# Patient Record
Sex: FEMALE | Race: WHITE | NOT HISPANIC OR LATINO | Employment: FULL TIME | URBAN - METROPOLITAN AREA
[De-identification: names, ages, dates, MRNs, and addresses within clinical notes are randomized per-mention and may not be internally consistent; named-entity substitution may affect disease eponyms.]

---

## 2023-07-18 ENCOUNTER — OFFICE VISIT (OUTPATIENT)
Dept: URGENT CARE | Facility: CLINIC | Age: 68
End: 2023-07-18
Payer: MEDICARE

## 2023-07-18 VITALS
BODY MASS INDEX: 25.76 KG/M2 | SYSTOLIC BLOOD PRESSURE: 160 MMHG | HEART RATE: 98 BPM | TEMPERATURE: 99 F | DIASTOLIC BLOOD PRESSURE: 94 MMHG | WEIGHT: 140 LBS | RESPIRATION RATE: 16 BRPM | HEIGHT: 62 IN | OXYGEN SATURATION: 99 %

## 2023-07-18 DIAGNOSIS — R03.0 ELEVATED BP WITHOUT DIAGNOSIS OF HYPERTENSION: ICD-10-CM

## 2023-07-18 DIAGNOSIS — J01.90 ACUTE RHINOSINUSITIS: Primary | ICD-10-CM

## 2023-07-18 PROCEDURE — 99202 OFFICE O/P NEW SF 15 MIN: CPT | Mod: 25,,, | Performed by: FAMILY MEDICINE

## 2023-07-18 PROCEDURE — 99202 PR OFFICE/OUTPT VISIT, NEW, LEVL II, 15-29 MIN: ICD-10-PCS | Mod: 25,,, | Performed by: FAMILY MEDICINE

## 2023-07-18 PROCEDURE — 96372 PR INJECTION,THERAP/PROPH/DIAG2ST, IM OR SUBCUT: ICD-10-PCS | Mod: ,,, | Performed by: FAMILY MEDICINE

## 2023-07-18 PROCEDURE — 96372 THER/PROPH/DIAG INJ SC/IM: CPT | Mod: ,,, | Performed by: FAMILY MEDICINE

## 2023-07-18 RX ORDER — DEXAMETHASONE SODIUM PHOSPHATE 100 MG/10ML
5 INJECTION INTRAMUSCULAR; INTRAVENOUS ONCE
Status: COMPLETED | OUTPATIENT
Start: 2023-07-18 | End: 2023-07-18

## 2023-07-18 RX ADMIN — DEXAMETHASONE SODIUM PHOSPHATE 5 MG: 100 INJECTION INTRAMUSCULAR; INTRAVENOUS at 09:07

## 2023-07-18 NOTE — PROGRESS NOTES
"Subjective:      Patient ID: Bianca Heath is a 67 y.o. female.    Vitals:  height is 5' 2" (1.575 m) and weight is 63.5 kg (140 lb). Her temperature is 98.5 °F (36.9 °C). Her blood pressure is 160/94 (abnormal) and her pulse is 98. Her respiration is 16 and oxygen saturation is 99%.     Chief Complaint: Sinus Problem (Friday sore throat, felt like fever, chills, sinus pressure, slight cough. Tried taking nyquil, excedrin. At home covid test negative. Declines further testing. )    HPI:  67-year-old female otherwise healthy present to clinic with concerns of nasal congestion, sinus congestion, sore throat, chills associated with postnasal drip and coughing since 5 days.  Symptoms gradual in onset and worsening.  No measured fever at home.  Over-the-counter medication as listed above some help.  States COVID test home has been negative.  No concerns of positive exposure to infections.  Blood pressure elevated.  Possible over-the-counter NyQuil.  Caution.    ROS :  Constitutional : _ felt feverish, positive for chills, no body aches or headache  Eyes : _No redness, drainage or pain  HENT_sore throat, postnasal drainage  Respiratory_no wheezing, no shortness of breath  Cardiovascular_no chest pain  Gastrointestinal_ No vomiting, No diarrhea, No abdominal pain  Musculoskeletal_no joint pain, no joint swelling  Integumentary_no skin rash or abnormal lesion    Objective:     Physical Exam  General : Alert and Oriented, No apparent distress, afebrile  Neck - supple  HENT : Oropharynx no redness or swelling.  Bilateral TMs intact mild fluid no redness.   Respiratory : Bilateral equal breath sounds, nonlabored respirations  Cardiovascular : Rate, rhythm regular, normal volume pulse, no murmur  Gastrointestinal: Full abdomen, soft, nontender to palpate  Integumentary : Warm, Dry and no rash    Assessment:     1. Acute rhinosinusitis    2. Elevated BP without diagnosis of hypertension      Plan:   Discussed the physical " finding, condition and course.  Monitor the symptoms closely.  Claritin or Allegra for congestion.  Robitussin DM for cough and cold.  Decadron IM today risk and benefits discussed voiced understanding.  For worsening symptoms and signs of infection call clinic will consider antibiotics.  Home COVID 19 test negative    Blood pressure elevated in the clinic today.  Concerns of over-the-counter cough and cold medications.  Caution.  Monitor blood pressure and maintain the log    Acute rhinosinusitis  -     dexAMETHasone injection 5 mg    Elevated BP without diagnosis of hypertension

## 2023-07-18 NOTE — PATIENT INSTRUCTIONS
Discussed the physical finding, condition and course.  Monitor the symptoms closely.  Claritin or Allegra for congestion.  Robitussin DM for cough and cold.  Decadron IM today risk and benefits discussed voiced understanding.  For worsening symptoms and signs of infection call clinic will consider antibiotics.  Home COVID 19 test negative    Blood pressure elevated in the clinic today.  Concerns of over-the-counter cough and cold medications.  Caution.  Monitor blood pressure and maintain the log

## 2023-07-18 NOTE — LETTER
July 18, 2023      [facility] [place]  [address]  [place] [address]  Phone: [phone]       Patient: Bianca Heath   YOB: 1955  Date of Visit: 07/18/2023    To Whom It May Concern:    Juan Heath  was at [facility] on 07/18/2023. The patient may return to work/school on 07/20/2023 with no restrictions. If you have any questions or concerns, or if I can be of further assistance, please do not hesitate to contact me.    Sincerely,

## 2023-09-07 ENCOUNTER — PATIENT MESSAGE (OUTPATIENT)
Dept: RESEARCH | Facility: HOSPITAL | Age: 68
End: 2023-09-07
Payer: MEDICARE

## 2023-11-30 ENCOUNTER — OFFICE VISIT (OUTPATIENT)
Dept: URGENT CARE | Facility: CLINIC | Age: 68
End: 2023-11-30
Payer: MEDICARE

## 2023-11-30 VITALS
OXYGEN SATURATION: 98 % | HEART RATE: 93 BPM | SYSTOLIC BLOOD PRESSURE: 158 MMHG | WEIGHT: 140 LBS | HEIGHT: 62 IN | TEMPERATURE: 98 F | DIASTOLIC BLOOD PRESSURE: 98 MMHG | RESPIRATION RATE: 18 BRPM | BODY MASS INDEX: 25.76 KG/M2

## 2023-11-30 DIAGNOSIS — J32.9 SINUSITIS, UNSPECIFIED CHRONICITY, UNSPECIFIED LOCATION: Primary | ICD-10-CM

## 2023-11-30 PROCEDURE — 96372 PR INJECTION,THERAP/PROPH/DIAG2ST, IM OR SUBCUT: ICD-10-PCS | Mod: ,,, | Performed by: FAMILY MEDICINE

## 2023-11-30 PROCEDURE — 99213 OFFICE O/P EST LOW 20 MIN: CPT | Mod: 25,,, | Performed by: FAMILY MEDICINE

## 2023-11-30 PROCEDURE — 96372 THER/PROPH/DIAG INJ SC/IM: CPT | Mod: ,,, | Performed by: FAMILY MEDICINE

## 2023-11-30 PROCEDURE — 99213 PR OFFICE/OUTPT VISIT, EST, LEVL III, 20-29 MIN: ICD-10-PCS | Mod: 25,,, | Performed by: FAMILY MEDICINE

## 2023-11-30 RX ORDER — AMOXICILLIN AND CLAVULANATE POTASSIUM 875; 125 MG/1; MG/1
1 TABLET, FILM COATED ORAL EVERY 12 HOURS
Qty: 14 TABLET | Refills: 0 | Status: SHIPPED | OUTPATIENT
Start: 2023-11-30 | End: 2023-12-07

## 2023-11-30 RX ORDER — PREDNISONE 10 MG/1
30 TABLET ORAL DAILY
Qty: 9 TABLET | Refills: 0 | Status: SHIPPED | OUTPATIENT
Start: 2023-11-30 | End: 2023-12-03

## 2023-11-30 RX ORDER — DEXAMETHASONE SODIUM PHOSPHATE 100 MG/10ML
8 INJECTION INTRAMUSCULAR; INTRAVENOUS
Status: COMPLETED | OUTPATIENT
Start: 2023-11-30 | End: 2023-11-30

## 2023-11-30 RX ADMIN — DEXAMETHASONE SODIUM PHOSPHATE 8 MG: 100 INJECTION INTRAMUSCULAR; INTRAVENOUS at 02:11

## 2023-11-30 NOTE — PATIENT INSTRUCTIONS
Plan:   Medications sent to pharmacy  Start oral steroids tomorrow  Hold antibiotics for 2-3 days and start if symptoms persist or worsen  Start taking an allergy pill daily such as claritin, zyrtec, allegrea or xyzal. Also start using a nasal steroid spray such as flonase or nasacort daily. If you are not being treated for high blood pressure, you can also take decongestant such as sudafed as needed. They can be purchased over the counter. If oral steroids were prescribed, start them tomorrow morning. Monitor for fever. Take tylenol/acetaminophen or ibuprofen as needed. Rest and hydrate. If symptoms persist or worsen, return to clinic or seek medical attention immediately.

## 2023-11-30 NOTE — PROGRESS NOTES
"Subjective:      Patient ID: Bianca Heath is a 67 y.o. female.    Vitals:  height is 5' 2" (1.575 m) and weight is 63.5 kg (140 lb). Her temperature is 98.1 °F (36.7 °C). Her blood pressure is 158/98 (abnormal) and her pulse is 93. Her respiration is 18 and oxygen saturation is 98%.     Chief Complaint: Sinus Problem (Sinus congestion, started 1 week )    67-year-old female presents to clinic complaining of a one-week history of cough and congestion.  Reports sinus pressure as well.  Thinks she may have had a fever the 1st 3 days.  Declines any testing.  Denies any shortness a breath body aches vomiting or diarrhea.  Does report some sinus headaches.        Constitution: Negative.   HENT:  Positive for congestion.    Cardiovascular: Negative.    Eyes: Negative.    Respiratory:  Positive for cough.    Gastrointestinal: Negative.    Genitourinary: Negative.    Musculoskeletal: Negative.    Skin: Negative.    Allergic/Immunologic: Negative.    Neurological: Negative.    Hematologic/Lymphatic: Negative.       Objective:     Physical Exam   Constitutional: She is oriented to person, place, and time. She appears well-developed. She is cooperative.  Non-toxic appearance. She does not appear ill. No distress.   HENT:   Head: Normocephalic and atraumatic.   Ears:   Right Ear: Hearing and external ear normal.   Left Ear: Hearing and external ear normal.   Mouth/Throat: Oropharynx is clear and moist and mucous membranes are normal. No oropharyngeal exudate or posterior oropharyngeal erythema (postnasal drip).   Eyes: Conjunctivae and lids are normal.   Neck: Trachea normal and phonation normal. Neck supple. No edema present. No erythema present. No neck rigidity present.   Cardiovascular: Normal rate.   Pulmonary/Chest: Effort normal and breath sounds normal. No stridor. No respiratory distress. She has no decreased breath sounds. She has no wheezes. She has no rhonchi. She has no rales.   Abdominal: Normal appearance. " "  Lymphadenopathy:     She has no cervical adenopathy.   Neurological: She is alert and oriented to person, place, and time. She exhibits normal muscle tone. Coordination normal.   Skin: Skin is warm, dry, intact, not diaphoretic and no rash.   Psychiatric: Her speech is normal and behavior is normal. Mood, judgment and thought content normal.   Nursing note and vitals reviewed.         Previous History      Review of patient's allergies indicates:   Allergen Reactions    Codeine Nausea And Vomiting       Past Medical History:   Diagnosis Date    Known health problems: none      Current Outpatient Medications   Medication Instructions    amoxicillin-clavulanate 875-125mg (AUGMENTIN) 875-125 mg per tablet 1 tablet, Oral, Every 12 hours    predniSONE (DELTASONE) 30 mg, Oral, Daily     Past Surgical History:   Procedure Laterality Date    LAPAROTOMY      Emergency Surgery post giving birth    TONSILLECTOMY       Family History   Problem Relation Age of Onset    Hypertension Mother     Diabetes Mother     Hypertension Father     Diabetes Father     Heart failure Father     Diabetes Sister     Diabetes Brother        Social History     Tobacco Use    Smoking status: Never     Passive exposure: Never    Smokeless tobacco: Never   Substance Use Topics    Alcohol use: Yes    Drug use: Never        Physical Exam      Vital Signs Reviewed   BP (!) 158/98   Pulse 93   Temp 98.1 °F (36.7 °C)   Resp 18   Ht 5' 2" (1.575 m)   Wt 63.5 kg (140 lb)   SpO2 98%   BMI 25.61 kg/m²        Procedures    Procedures     Labs   No results found for this or any previous visit.    Assessment:     1. Sinusitis, unspecified chronicity, unspecified location        Plan:   Medications sent to pharmacy  Start oral steroids tomorrow  Hold antibiotics for 2-3 days and start if symptoms persist or worsen  Start taking an allergy pill daily such as claritin, zyrtec, allegrea or xyzal. Also start using a nasal steroid spray such as flonase or " nasacort daily. If you are not being treated for high blood pressure, you can also take decongestant such as sudafed as needed. They can be purchased over the counter. If oral steroids were prescribed, start them tomorrow morning. Monitor for fever. Take tylenol/acetaminophen or ibuprofen as needed. Rest and hydrate. If symptoms persist or worsen, return to clinic or seek medical attention immediately.       Sinusitis, unspecified chronicity, unspecified location    Other orders  -     dexAMETHasone injection 8 mg  -     predniSONE (DELTASONE) 10 MG tablet; Take 3 tablets (30 mg total) by mouth once daily. for 3 days  Dispense: 9 tablet; Refill: 0  -     amoxicillin-clavulanate 875-125mg (AUGMENTIN) 875-125 mg per tablet; Take 1 tablet by mouth every 12 (twelve) hours. for 7 days  Dispense: 14 tablet; Refill: 0

## 2024-05-06 ENCOUNTER — OFFICE VISIT (OUTPATIENT)
Dept: URGENT CARE | Facility: CLINIC | Age: 69
End: 2024-05-06
Payer: MEDICARE

## 2024-05-06 VITALS
WEIGHT: 150 LBS | HEIGHT: 62 IN | DIASTOLIC BLOOD PRESSURE: 89 MMHG | BODY MASS INDEX: 27.6 KG/M2 | TEMPERATURE: 98 F | SYSTOLIC BLOOD PRESSURE: 161 MMHG | HEART RATE: 85 BPM | OXYGEN SATURATION: 99 % | RESPIRATION RATE: 16 BRPM

## 2024-05-06 DIAGNOSIS — J06.9 ACUTE URI: Primary | ICD-10-CM

## 2024-05-06 PROCEDURE — 96372 THER/PROPH/DIAG INJ SC/IM: CPT | Mod: ,,, | Performed by: FAMILY MEDICINE

## 2024-05-06 PROCEDURE — 99212 OFFICE O/P EST SF 10 MIN: CPT | Mod: 25,,, | Performed by: FAMILY MEDICINE

## 2024-05-06 RX ORDER — BETAMETHASONE SODIUM PHOSPHATE AND BETAMETHASONE ACETATE 3; 3 MG/ML; MG/ML
6 INJECTION, SUSPENSION INTRA-ARTICULAR; INTRALESIONAL; INTRAMUSCULAR; SOFT TISSUE
Status: COMPLETED | OUTPATIENT
Start: 2024-05-06 | End: 2024-05-06

## 2024-05-06 RX ADMIN — BETAMETHASONE SODIUM PHOSPHATE AND BETAMETHASONE ACETATE 6 MG: 3; 3 INJECTION, SUSPENSION INTRA-ARTICULAR; INTRALESIONAL; INTRAMUSCULAR; SOFT TISSUE at 10:05

## 2024-05-06 NOTE — LETTER
05/07/2024  May 6, 2024      [facility] [place] [address]  [facility] [address]  Phone: [phone]       Patient: Bianca Heath   YOB: 1955  Date of Visit: 05/06/2024    To Whom It May Concern:    Juan Heath  was at [facility] on 05/06/2024. The patient may return to work/school on 05/07/2024 with no restrictions. If you have any questions or concerns, or if I can be of further assistance, please do not hesitate to contact me.    Sincerely,    Lilliana Awad MA

## 2024-05-06 NOTE — PATIENT INSTRUCTIONS
Discussed the physical finding ,  concerns of allergies.  Condition and course discussed as well.  Tylenol and ibuprofen for fever, body aches and sore throat.   Warm saltwater gargles for sore throat.   Claritin 10 mg or Zyrtec 10 mg for nasal congestion    Celestone IM today as anti inflammation, risk and benefits discussed voiced understanding  Robitussin-DM for cough and cold as needed and as directed   Call or return to clinic for any questions.  Follow up with primary MD

## 2024-05-06 NOTE — PROGRESS NOTES
"Subjective:      Patient ID: Bianca Heath is a 68 y.o. female.    Vitals:  height is 5' 2" (1.575 m) and weight is 68 kg (150 lb). Her temperature is 98.1 °F (36.7 °C). Her blood pressure is 161/89 (abnormal) and her pulse is 85. Her respiration is 16 and oxygen saturation is 99%.     Chief Complaint: Nasal Congestion     Patient is a 68 y.o. female who presents to urgent care with complaints of congestion, headache, and a post nasal drip x 3 days. Alleviating factors include Aleve and Nyquil with mild amount of relief.    ROS :  Constitutional : _  no fever, no body aches, positive for headache  Eyes : _No redness, drainage or pain  HENT_ positive for nasal congestion, sinus congestion and postnasal drip  Respiratory_no wheezing, no shortness of breath  Cardiovascular_no chest pain  Gastrointestinal_ No vomiting, No diarrhea, No abdominal pain  Musculoskeletal_no joint pain, no joint swelling  Integumentary_no skin rash      Reviewed vital signs, blood pressure elevated in the clinic.  Caution with over-the-counter cough and cold medication.  No concerns of positive exposure to infections.  States was on vacation.  Requesting for cortisone injection as it helped in the past, understands the risks and benefits    Physical Exam  General : Alert and Oriented, No apparent distress, afebrile, sounds stuffy and congested  Neck - supple  HENT : Oropharynx no redness or swelling. Tonsils  absent, bilateral TMs intact mild fluid no redness.   Respiratory : Bilateral equal breath sounds, nonlabored respirations  Cardiovascular : Rate, rhythm regular, normal volume pulse, no murmur  Integumentary : Warm, Dry and no rash    Assessment:     1. Acute URI      Plan:   Discussed the physical finding ,  concerns of allergies.  Condition and course discussed as well.  Tylenol and ibuprofen for fever, body aches and sore throat.   Warm saltwater gargles for sore throat.   Claritin 10 mg or Zyrtec 10 mg for nasal congestion    " Celestone IM today as anti inflammation, risk and benefits discussed voiced understanding  Robitussin-DM for cough and cold as needed and as directed   Call or return to clinic for any questions.  Follow up with primary MD    Acute URI  -     betamethasone acetate-betamethasone sodium phosphate injection 6 mg

## 2025-02-27 ENCOUNTER — PATIENT MESSAGE (OUTPATIENT)
Facility: CLINIC | Age: 70
End: 2025-02-27
Payer: MEDICARE

## 2025-02-27 ENCOUNTER — PATIENT OUTREACH (OUTPATIENT)
Facility: CLINIC | Age: 70
End: 2025-02-27
Payer: MEDICARE

## 2025-02-27 NOTE — PROGRESS NOTES
Health Maintenance Topic(s) Outreach Outcomes & Actions Taken:     Additional Notes:  Primary Care Follow Up Call: Patient states will call if she decides: I sent her a list of PCP with appointments available.

## 2025-04-01 ENCOUNTER — OFFICE VISIT (OUTPATIENT)
Dept: URGENT CARE | Facility: CLINIC | Age: 70
End: 2025-04-01
Payer: MEDICARE

## 2025-04-01 VITALS
DIASTOLIC BLOOD PRESSURE: 97 MMHG | OXYGEN SATURATION: 97 % | HEART RATE: 74 BPM | WEIGHT: 150 LBS | SYSTOLIC BLOOD PRESSURE: 140 MMHG | HEIGHT: 62 IN | TEMPERATURE: 98 F | BODY MASS INDEX: 27.6 KG/M2 | RESPIRATION RATE: 17 BRPM

## 2025-04-01 DIAGNOSIS — R50.9 FEVER, UNSPECIFIED FEVER CAUSE: Primary | ICD-10-CM

## 2025-04-01 DIAGNOSIS — A08.0 ROTAVIRUS INFECTION: ICD-10-CM

## 2025-04-01 DIAGNOSIS — B34.9 ACUTE VIRAL SYNDROME: ICD-10-CM

## 2025-04-01 LAB
CTP QC/QA: YES
POC MOLECULAR INFLUENZA A AGN: NEGATIVE
POC MOLECULAR INFLUENZA B AGN: NEGATIVE

## 2025-04-01 PROCEDURE — 99213 OFFICE O/P EST LOW 20 MIN: CPT | Mod: ,,, | Performed by: FAMILY MEDICINE

## 2025-04-01 PROCEDURE — 87502 INFLUENZA DNA AMP PROBE: CPT | Mod: QW,,, | Performed by: FAMILY MEDICINE

## 2025-04-01 NOTE — PATIENT INSTRUCTIONS
Assessment/Plan:   Fever, unspecified fever cause  -     POCT Influenza A/B Molecular  No fever today.  Flu negative.  Acute viral syndrome  Duration of illness resolving.  Progressing towards baseline.  Recommend rest over the next 24-48 hours.  Recommend oral hydration with low sugar/decrease carbohydrate electrolyte replacement beverages.  Rotavirus infection  Resolving.  Return to clinic as needed.     Orders Placed This Encounter   Procedures    POCT Influenza A/B Molecular       Education and counseling done face to face regarding medical conditions and plan. Contact office if new symptoms develop. Should any symptoms ever significantly worsen seek emergency medical attention/go to ER. Follow up at least yearly for wellness or sooner PRN.

## 2025-04-01 NOTE — PROGRESS NOTES
"Patient ID: Bianca Heath is a 69 y.o. female.  Chief Complaint: Diarrhea    HPI:   Patient presents here today for above reason.     Patient is a 69 y.o. female who presents to urgent care reporting symptoms of vomiting, diarrhea x Friday at 3 pm. HA, subjective fever, chills x Saturday, with symptoms of vomiting and diarrhea subsiding. Today---general malaise, weakness, HA, reduced appetite. Attributes onset to likely "stomach virus".  Alleviating factors include increasing fluid intake for symptoms of dehydration, aleve for HA's for marginal resolve. Denies current fever. Open to flu testing.     Past Medical History:  Past Medical History:   Diagnosis Date    Known health problems: none      Past Surgical History:   Procedure Laterality Date    LAPAROTOMY      Emergency Surgery post giving birth    TONSILLECTOMY       Review of patient's allergies indicates:   Allergen Reactions    Codeine Nausea And Vomiting     No current outpatient medications  Social History[1]    ROS:   Review of Systems  12 point review of systems conducted, negative except as stated in the history of present illness. See HPI for details.   Vitals/PE:   Visit Vitals  BP (!) 140/97 (Patient Position: Sitting)   Pulse 74   Temp 97.7 °F (36.5 °C)   Resp 17   Ht 5' 2" (1.575 m)   Wt 68 kg (150 lb)   SpO2 97%   BMI 27.44 kg/m²     Physical Exam  General: Alert and oriented, No acute distress.   Eye: Normal conjunctiva without exudate.  HENMT: Normocephalic/AT, Normal hearing, Oral mucosa is moist and pink   Neck: No goiter visualized.   Respiratory: Lungs CTAB, Respirations are non-labored, Breath sounds are equal, Symmetrical chest wall expansion.  Cardiovascular: Normal rate, Regular rhythm, No murmur, No edema.   Gastrointestinal: Non-distended.   Genitourinary: Deferred.  Musculoskeletal: Normal ROM, Normal gait, No deformities or amputations.  Integumentary: Warm, Dry, Intact. No diaphoresis, or flushing.  Neurologic: No focal " deficits, Cranial Nerves II-XII are grossly intact.   Psychiatric: Cooperative, Appropriate mood & affect, Normal judgment, Non-suicidal.    Results for orders placed or performed in visit on 04/01/25   POCT Influenza A/B Molecular    Collection Time: 04/01/25  9:42 AM   Result Value Ref Range    POC Molecular Influenza A Ag Negative Negative    POC Molecular Influenza B Ag Negative Negative     Acceptable Yes      Assessment/Plan:   Fever, unspecified fever cause  -     POCT Influenza A/B Molecular  No fever today.  Flu negative.  Acute viral syndrome  Duration of illness resolving.  Progressing towards baseline.  Recommend rest over the next 24-48 hours.  Recommend oral hydration with low sugar/decrease carbohydrate electrolyte replacement beverages.  Rotavirus infection  Resolving.  Return to clinic as needed.     Orders Placed This Encounter   Procedures    POCT Influenza A/B Molecular       Education and counseling done face to face regarding medical conditions and plan. Contact office if new symptoms develop. Should any symptoms ever significantly worsen seek emergency medical attention/go to ER. Follow up at least yearly for wellness or sooner PRN. Nurse to call patient with any results. The patient is receptive, expresses understanding and is agreeable to plan. All questions have been answered.             [1]   Social History  Socioeconomic History    Marital status: Single   Tobacco Use    Smoking status: Never     Passive exposure: Never    Smokeless tobacco: Never   Substance and Sexual Activity    Alcohol use: Not Currently    Drug use: Never

## 2025-08-23 ENCOUNTER — PATIENT MESSAGE (OUTPATIENT)
Dept: RESEARCH | Facility: HOSPITAL | Age: 70
End: 2025-08-23
Payer: MEDICARE